# Patient Record
(demographics unavailable — no encounter records)

---

## 2025-07-10 NOTE — IMAGING
[de-identified] : RIGHT HAND skin intact. mild swelling throughout thumb. TTP to thumb A1 pulley. thumb IPJ: mild flexion contracture, otherwise good extension. very limited flexion. fingers good extension, flex to full fist. good finger abduction and adduction. SILT to median, ulnar, radial distribution. palpable radial pulse, brisk cap refill all digits. + triggering of thumb.  @7/10/25 XRAYS OF RIGHT THUMB (3 views - PA, OBLIQUE, AND LATERAL VIEWS): no acute displaced fracture or dislocation. djd of thumb IPJ.

## 2025-07-10 NOTE — HISTORY OF PRESENT ILLNESS
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 Denies injury. Reports PCP ordered labs, which were negative for gout.  Hx: HTN. HLD. [FreeTextEntry5] : SARAH blair [RHD] 51 year old male is here today for evaluation of RIGHT thumb stiffness and pain x 2 months w/o injury. saw PCP, had bloodwork for gout (negative).

## 2025-07-10 NOTE — ASSESSMENT
[FreeTextEntry1] : The condition was explained to the patient. Discussed risks and benefits of treatment options for tenosynovitis - activity modification, NSAID, splint, steroid injection, or surgery. Patient would like to proceed with CSI for trigger finger. - Discussed risks, benefits, and alternatives as well as contents of injection. Risks include, but are not limited allergic reaction, flare reaction, injection site pain, bruising, numbness, increased blood sugar, elevated blood pressure, facial flushing, skin discoloration, fat atrophy, tendon rupture, and infection. Risk of immune suppression and increased susceptibility to infection with steroid use. We discussed that too many injections may lead to weakening of the tendon and tendon rupture. Patient expressed understanding and would like to proceed with injection. - The skin over the RIGHT thumb A1 pulley was cleansed with alcohol and anesthetized with ethyl chloride. The flexor sheath was injected with 3mg of celestone, 0.5cc of 1% lidocaine. Site was dressed with a band-aid. Patient tolerated the procedure well. - discussed that it may take up to 1 week for symptoms to improve after CSI.  F/u 2 weeks for motion check.

## 2025-07-24 NOTE — ASSESSMENT
[FreeTextEntry1] : Reviewed risks and benefits of treatment options for tenosynovitis - activity modification, NSAID, splint, steroid injection, or surgery.  He would like to continue conservative treatment. - prescribed Diclofenac gel TID x 1-2 weeks, then PRN. There is a moderate risk of morbidity with prescription medication due to possible side effects, especially with long term use. These include but are not limited to GI issues (such as stomach pain/upset, heartburn, stomach ulcer), elevated blood pressure, dizziness, bleeding, kidney problems, and cardiac problems. Also discussed risk of skin reactions to topical medications.  F/u PRN.

## 2025-07-24 NOTE — HISTORY OF PRESENT ILLNESS
[de-identified] : 7/24/25:  - f/u RIGHT trigger thumb. s/p CSI on 7/10/25. motion is much better, but still has some pain. - f/u RIGHT thumb IPJ arthritis.  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 Denies injury. Reports PCP ordered labs, which were negative for gout.  Hx: HTN. HLD. [FreeTextEntry5] : SARAH is here today to follow up on his RIGHT thumb. states pain and stiffness decreased after CSI.

## 2025-07-24 NOTE — IMAGING
[de-identified] : RIGHT HAND skin intact. mild swelling throughout thumb. TTP to thumb A1 pulley. thumb IPJ: mild flexion contracture, otherwise good extension. mild limited flexion. fingers good extension, flex to full fist. good finger abduction and adduction. SILT to median, ulnar, radial distribution. palpable radial pulse, brisk cap refill all digits. no triggering.  @7/10/25 XRAYS OF RIGHT THUMB (3 views - PA, OBLIQUE, AND LATERAL VIEWS): no acute displaced fracture or dislocation. djd of thumb IPJ.